# Patient Record
Sex: FEMALE | Race: WHITE
[De-identification: names, ages, dates, MRNs, and addresses within clinical notes are randomized per-mention and may not be internally consistent; named-entity substitution may affect disease eponyms.]

---

## 2018-03-24 ENCOUNTER — HOSPITAL ENCOUNTER (EMERGENCY)
Dept: HOSPITAL 17 - NEDAMB | Age: 83
Discharge: NURSING, REHAB OR LONG-TERM CARE | End: 2018-03-24
Payer: MEDICARE

## 2018-03-24 VITALS
HEART RATE: 48 BPM | DIASTOLIC BLOOD PRESSURE: 65 MMHG | OXYGEN SATURATION: 100 % | SYSTOLIC BLOOD PRESSURE: 149 MMHG | TEMPERATURE: 97.7 F | RESPIRATION RATE: 20 BRPM

## 2018-03-24 VITALS — DIASTOLIC BLOOD PRESSURE: 35 MMHG | SYSTOLIC BLOOD PRESSURE: 122 MMHG

## 2018-03-24 DIAGNOSIS — E78.00: ICD-10-CM

## 2018-03-24 DIAGNOSIS — I10: ICD-10-CM

## 2018-03-24 DIAGNOSIS — R00.1: ICD-10-CM

## 2018-03-24 DIAGNOSIS — Z88.1: ICD-10-CM

## 2018-03-24 DIAGNOSIS — E11.9: ICD-10-CM

## 2018-03-24 DIAGNOSIS — Z88.5: ICD-10-CM

## 2018-03-24 DIAGNOSIS — F03.90: ICD-10-CM

## 2018-03-24 DIAGNOSIS — E20.9: ICD-10-CM

## 2018-03-24 DIAGNOSIS — R07.9: Primary | ICD-10-CM

## 2018-03-24 LAB
ALBUMIN SERPL-MCNC: 3.1 GM/DL (ref 3.4–5)
ALP SERPL-CCNC: 70 U/L (ref 45–117)
ALT SERPL-CCNC: 28 U/L (ref 10–53)
AST SERPL-CCNC: 30 U/L (ref 15–37)
BILIRUB SERPL-MCNC: 0.5 MG/DL (ref 0.2–1)
BUN SERPL-MCNC: 25 MG/DL (ref 7–18)
CALCIUM SERPL-MCNC: 9.4 MG/DL (ref 8.5–10.1)
CHLORIDE SERPL-SCNC: 108 MEQ/L (ref 98–107)
CREAT SERPL-MCNC: 1.08 MG/DL (ref 0.5–1)
ERYTHROCYTE [DISTWIDTH] IN BLOOD BY AUTOMATED COUNT: 14.3 % (ref 11.6–17.2)
GFR SERPLBLD BASED ON 1.73 SQ M-ARVRAT: 48 ML/MIN (ref 89–?)
GLUCOSE SERPL-MCNC: 101 MG/DL (ref 74–106)
HCO3 BLD-SCNC: 27.6 MEQ/L (ref 21–32)
HCT VFR BLD CALC: 38.4 % (ref 35–46)
HGB BLD-MCNC: 12.9 GM/DL (ref 11.6–15.3)
INR PPP: 1 RATIO
MCH RBC QN AUTO: 32.3 PG (ref 27–34)
MCHC RBC AUTO-ENTMCNC: 33.7 % (ref 32–36)
MCV RBC AUTO: 95.9 FL (ref 80–100)
PLATELET # BLD: 186 TH/MM3 (ref 150–450)
PMV BLD AUTO: 10.7 FL (ref 7–11)
PROT SERPL-MCNC: 6.3 GM/DL (ref 6.4–8.2)
PROTHROMBIN TIME: 10.6 SEC (ref 9.8–11.6)
RBC # BLD AUTO: 4.01 MIL/MM3 (ref 4–5.3)
SODIUM SERPL-SCNC: 141 MEQ/L (ref 136–145)
TROPONIN I SERPL-MCNC: 0.04 NG/ML (ref 0.02–0.05)
WBC # BLD AUTO: 7.3 TH/MM3 (ref 4–11)

## 2018-03-24 PROCEDURE — 80053 COMPREHEN METABOLIC PANEL: CPT

## 2018-03-24 PROCEDURE — 85610 PROTHROMBIN TIME: CPT

## 2018-03-24 PROCEDURE — 84484 ASSAY OF TROPONIN QUANT: CPT

## 2018-03-24 PROCEDURE — 85027 COMPLETE CBC AUTOMATED: CPT

## 2018-03-24 PROCEDURE — 93005 ELECTROCARDIOGRAM TRACING: CPT

## 2018-03-24 PROCEDURE — 82550 ASSAY OF CK (CPK): CPT

## 2018-03-24 PROCEDURE — 85730 THROMBOPLASTIN TIME PARTIAL: CPT

## 2018-03-24 NOTE — EKG
Date Performed: 2018       Time Performed: 04:53:52

 

PTAGE:      87 years

 

EKG:      SINUS BRADYCARDIA VOLTAGE CRITERIA FOR LVH ST ELEVATION, CONSIDER ANTERIOR INJURY ***ACUTE 
MI*** Compared to 

 

 PREVIOUS TRACING            , there is now evidence of ST elevation inferiorly and anteriorly and an
terolaterally. Suggestive of an acute myocardial infarction. When compared to the prior study, there 
are significant changes in the anterior and lateral leads. PREVIOUS TRACIN/15/1998 09.23.29

 

DOCTOR:   Seven Neville  Interpretating Date/Time  2018 21:30:32

## 2018-03-24 NOTE — PD
HPI


Chief Complaint:  Chest Pain


Time Seen by Provider:  05:00


Travel History


International Travel<30 days:  No


Contact w/Intl Traveler<30days:  No


Traveled to known affect area:  No





History of Present Illness


HPI


Patient is a 87-year-old female living in a nursing home she was called 

paramedics because she had right-sided chest pain and was walking around the 

floors of the nursing home complaining of chest pain paramedics arrived they 

did EKG twelve-lead they thought they saw ST elevations in inferior leads lead 

II, III and aVF.  Patient is completely pain-free vitals within normal limits 

she was not hypertensive she is not tachycardic and she had no signs of 

ischemia no nausea no vomiting patient is pleasantly demented and denied chest 

pain at all repeatedly asked she has no chest pain we do an EKG that shows ST 

elevations in V4 V5.  There were no reciprocal depressions anywhere.  I called 

Dr. Chen's interventional cardiologist.  He thinks that intervention would 

be extremely in the pain-free 87-year-old female.  Call the family daughter 

arrives bedside and decides that they would like medical management.  Aspirin 

for 325 is given.  And labs are sent family is discussing whether they would 

like to send her back to the nursing home without intervention without 

hospitalization





PFSH


Past Medical History


High Cholesterol:  Yes


Dementia:  Yes


Diabetes:  Yes


Patient Takes Glucophage:  Yes


Genitourinary:  Yes (HYDRONEPHROSIS)


Hypertension:  Yes


Musculoskeletal:  Yes (PREPATELLAR BURSITIS)


Thyroid Disease:  Yes (HYPOPARATHYROID)





Past Surgical History


Surgical History:  Unable to Obtain





Social History


Alcohol Use:  No


Tobacco Use:  No


Substance Use:  No





Allergies-Medications


(Allergen,Severity, Reaction):  


Coded Allergies:  


     Milk Containing Products (Verified  Allergy, Unknown, 3/24/18)


     codeine (Verified  Allergy, Unknown, 3/24/18)


     erythromycin base (Verified  Allergy, Unknown, 3/24/18)


     pollen extracts (Verified  Allergy, Unknown, 3/24/18)





Review of Systems


ROS Limitations:  Poor Historian


Except as stated in HPI:  all other systems reviewed are Neg (dementia advanced)





Physical Exam


Narrative


GENERAL: thin body habitus  , pt  awake alert  Ox1 to  her name , does not 

recognize  daughter 


SKIN: Warm and dry.


HEAD: Atraumatic. Normocephalic. 


EYES: Pupils equal and round. No scleral icterus. No injection or drainage. 


ENT: No nasal bleeding or discharge.  Mucous membranes pink and moist.


NECK: Trachea midline. No JVD. 


CARDIOVASCULAR: Regular rate and rhythm.  


RESPIRATORY: No accessory muscle use. Clear to auscultation. Breath sounds 

equal bilaterally. 


GASTROINTESTINAL: Abdomen soft, non-tender, nondistended. Hepatic and splenic 

margins not palpable. 


MUSCULOSKELETAL: Extremities without clubbing, cyanosis, or edema. No obvious 

deformities. 


NEUROLOGICAL: Awake and alert. No obvious cranial nerve deficits.  Motor 

grossly within normal limits. Five out of 5 muscle strength in the arms and 

legs.  Normal speech.


PSYCHIATRIC: demented  OX1 . not to time place and does not recognized her 

daughter who is bedside





Data


Data


Last Documented VS





Vital Signs








  Date Time  Temp Pulse Resp B/P (MAP) Pulse Ox O2 Delivery O2 Flow Rate FiO2


 


3/24/18 07:51  52 17 122/35 (64) 99   


 


3/24/18 05:01 97.7       








Orders





 Orders


Aspirin Chew (Aspirin Chew) (3/24/18 05:06)


Heparin Inj (Heparin Inj) (3/24/18 05:15)


Heparin Inj (Heparin Inj) (3/24/18 11:15)


Heparin Inj (Heparin Inj) (3/24/18 11:15)


Heparin-D5w 25,000 U/250 Ml (Heparin-D5w (3/24/18 05:15)


Act Partial Throm Time (Ptt) (3/24/18 05:05)


Prothrombin Time / Inr (Pt) (3/24/18 05:05)


Cbc No Diff, Includes Plts (3/24/18 05:05)


Troponin I (3/24/18 05:07)


Ckmb (Isoenzyme) Profile (3/24/18 05:07)


Comprehensive Metabolic Panel (3/24/18 05:07)


Nitroglycerin Sl (Nitrostat Sl) (3/24/18 05:15)


Nitroglycerin 2% Oint (Nitroglycerin 2% (3/24/18 05:15)


Aspirin Chew (Aspirin Chew) (3/24/18 05:15)


Heparin Inj (Heparin Inj) (3/24/18 05:30)


Ed Discharge Order (3/24/18 06:44)


Electrocardiogram (3/24/18 04:53)





Labs





Laboratory Tests








Test


  3/24/18


05:09


 


White Blood Count 7.3 TH/MM3 


 


Red Blood Count 4.01 MIL/MM3 


 


Hemoglobin 12.9 GM/DL 


 


Hematocrit 38.4 % 


 


Mean Corpuscular Volume 95.9 FL 


 


Mean Corpuscular Hemoglobin 32.3 PG 


 


Mean Corpuscular Hemoglobin


Concent 33.7 % 


 


 


Red Cell Distribution Width 14.3 % 


 


Platelet Count 186 TH/MM3 


 


Mean Platelet Volume 10.7 FL 


 


Prothrombin Time 10.6 SEC 


 


Prothromb Time International


Ratio 1.0 RATIO 


 


 


Activated Partial


Thromboplast Time 22.4 SEC 


 


 


Blood Urea Nitrogen 25 MG/DL 


 


Creatinine 1.08 MG/DL 


 


Random Glucose 101 MG/DL 


 


Total Protein 6.3 GM/DL 


 


Albumin 3.1 GM/DL 


 


Calcium Level 9.4 MG/DL 


 


Alkaline Phosphatase 70 U/L 


 


Aspartate Amino Transf


(AST/SGOT) 30 U/L 


 


 


Alanine Aminotransferase


(ALT/SGPT) 28 U/L 


 


 


Total Bilirubin 0.5 MG/DL 


 


Sodium Level 141 MEQ/L 


 


Potassium Level 4.8 MEQ/L 


 


Chloride Level 108 MEQ/L 


 


Carbon Dioxide Level 27.6 MEQ/L 


 


Anion Gap 5 MEQ/L 


 


Estimat Glomerular Filtration


Rate 48 ML/MIN 


 


 


Total Creatine Kinase 75 U/L 


 


Troponin I 0.04 NG/ML 











[facility]


Medical Decision Making


Medical Screen Exam Complete:  Yes


Emergency Medical Condition:  Yes


Differential Diagnosis


chest pain  of  PNA  vs  STEMI  vs  costochondritis  vs  dementia and  

complaint of  CP  possibly  not related to Organic cause, Paramedics  12 lead  

have  slight elevation   inferiro leads   but in our  ER  12 lead  EKG  shows  

elevation  in  V4  V5, pt denies  CP  repeatedly  asked of  Chest Pain


Narrative Course


EKG  has   elevations  in  V4  V5   no  CP  ,  I call Dr Chen  who is  on 

for interventional cards  he looks at  EKG  and  feels  her age  and  DNR  and 

advanced dementia  and  pain free state  medical management  more indicated 

than   catherization ,  I will call family to ask if they agree with the plan  

of  conservative  managemnt  . Daugther arrives and she and her  sister are 

offered option of  catheterization  vs  heparin and  asa and  obsevartion on  

tele  , The  daughters  decide  they  would  like to  refuse all intervention 

and return to  [facility]  aand  there they will discuss  " Do  Not  Hositalize " order 

and  hospice  plan.  I speak to  both daughters several times  one bedside and 

one on the phone  . Pt  stable  first  trop returns  0.04  equivocal  . pt  

family  refuses admission and heparinization.. Pt sent via  ambulance back to 

[facility]





Diagnosis





 Primary Impression:  


 Chest pain


Patient Instructions:  Chest Pain (ED), General Instructions


Disposition:  03 DISCHARGE TO SNF


Condition:  Js Ontiveros MD Mar 24, 2018 06:42

## 2018-04-28 ENCOUNTER — HOSPITAL ENCOUNTER (INPATIENT)
Dept: HOSPITAL 17 - NEPE | Age: 83
LOS: 4 days | Discharge: NURSING, REHAB OR LONG-TERM CARE | DRG: 125 | End: 2018-05-02
Attending: HOSPITALIST | Admitting: HOSPITALIST
Payer: MEDICARE

## 2018-04-28 VITALS
DIASTOLIC BLOOD PRESSURE: 76 MMHG | TEMPERATURE: 98.1 F | OXYGEN SATURATION: 95 % | SYSTOLIC BLOOD PRESSURE: 158 MMHG | HEART RATE: 50 BPM | RESPIRATION RATE: 18 BRPM

## 2018-04-28 VITALS — HEIGHT: 61 IN | BODY MASS INDEX: 23.31 KG/M2 | WEIGHT: 123.46 LBS

## 2018-04-28 DIAGNOSIS — Z88.5: ICD-10-CM

## 2018-04-28 DIAGNOSIS — Z88.1: ICD-10-CM

## 2018-04-28 DIAGNOSIS — S00.83XA: ICD-10-CM

## 2018-04-28 DIAGNOSIS — Z88.8: ICD-10-CM

## 2018-04-28 DIAGNOSIS — S02.32XA: Primary | ICD-10-CM

## 2018-04-28 DIAGNOSIS — R04.0: ICD-10-CM

## 2018-04-28 DIAGNOSIS — Z91.011: ICD-10-CM

## 2018-04-28 DIAGNOSIS — I10: ICD-10-CM

## 2018-04-28 DIAGNOSIS — G30.9: ICD-10-CM

## 2018-04-28 DIAGNOSIS — D64.9: ICD-10-CM

## 2018-04-28 DIAGNOSIS — F02.80: ICD-10-CM

## 2018-04-28 DIAGNOSIS — K92.0: ICD-10-CM

## 2018-04-28 DIAGNOSIS — E78.5: ICD-10-CM

## 2018-04-28 DIAGNOSIS — E11.9: ICD-10-CM

## 2018-04-28 DIAGNOSIS — S02.2XXA: ICD-10-CM

## 2018-04-28 DIAGNOSIS — Z79.82: ICD-10-CM

## 2018-04-28 DIAGNOSIS — H53.2: ICD-10-CM

## 2018-04-28 DIAGNOSIS — S40.022A: ICD-10-CM
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
## 2018-04-28 DIAGNOSIS — S50.12XA: ICD-10-CM

## 2018-04-28 DIAGNOSIS — Z79.84: ICD-10-CM

## 2018-04-28 DIAGNOSIS — E20.9: ICD-10-CM

## 2018-04-28 LAB
BASOPHILS # BLD AUTO: 0 TH/MM3 (ref 0–0.2)
BASOPHILS NFR BLD: 0.7 % (ref 0–2)
BUN SERPL-MCNC: 20 MG/DL (ref 7–18)
CALCIUM SERPL-MCNC: 9.3 MG/DL (ref 8.5–10.1)
CHLORIDE SERPL-SCNC: 102 MEQ/L (ref 98–107)
CREAT SERPL-MCNC: 1.06 MG/DL (ref 0.5–1)
EOSINOPHIL # BLD: 0.2 TH/MM3 (ref 0–0.4)
EOSINOPHIL NFR BLD: 3.1 % (ref 0–4)
ERYTHROCYTE [DISTWIDTH] IN BLOOD BY AUTOMATED COUNT: 13.3 % (ref 11.6–17.2)
GFR SERPLBLD BASED ON 1.73 SQ M-ARVRAT: 49 ML/MIN (ref 89–?)
GLUCOSE SERPL-MCNC: 146 MG/DL (ref 74–106)
HCO3 BLD-SCNC: 30.2 MEQ/L (ref 21–32)
HCT VFR BLD CALC: 39 % (ref 35–46)
HGB BLD-MCNC: 13.1 GM/DL (ref 11.6–15.3)
LYMPHOCYTES # BLD AUTO: 2.4 TH/MM3 (ref 1–4.8)
LYMPHOCYTES NFR BLD AUTO: 36.1 % (ref 9–44)
MCH RBC QN AUTO: 31.9 PG (ref 27–34)
MCHC RBC AUTO-ENTMCNC: 33.6 % (ref 32–36)
MCV RBC AUTO: 95 FL (ref 80–100)
MONOCYTE #: 0.5 TH/MM3 (ref 0–0.9)
MONOCYTES NFR BLD: 7.9 % (ref 0–8)
NEUTROPHILS # BLD AUTO: 3.5 TH/MM3 (ref 1.8–7.7)
NEUTROPHILS NFR BLD AUTO: 52.2 % (ref 16–70)
PLATELET # BLD: 163 TH/MM3 (ref 150–450)
PMV BLD AUTO: 10.3 FL (ref 7–11)
RBC # BLD AUTO: 4.1 MIL/MM3 (ref 4–5.3)
SODIUM SERPL-SCNC: 140 MEQ/L (ref 136–145)
WBC # BLD AUTO: 6.7 TH/MM3 (ref 4–11)

## 2018-04-28 PROCEDURE — 85025 COMPLETE CBC W/AUTO DIFF WBC: CPT

## 2018-04-28 PROCEDURE — 72125 CT NECK SPINE W/O DYE: CPT

## 2018-04-28 PROCEDURE — 73060 X-RAY EXAM OF HUMERUS: CPT

## 2018-04-28 PROCEDURE — 82550 ASSAY OF CK (CPK): CPT

## 2018-04-28 PROCEDURE — 85027 COMPLETE CBC AUTOMATED: CPT

## 2018-04-28 PROCEDURE — 80053 COMPREHEN METABOLIC PANEL: CPT

## 2018-04-28 PROCEDURE — 70450 CT HEAD/BRAIN W/O DYE: CPT

## 2018-04-28 PROCEDURE — 73120 X-RAY EXAM OF HAND: CPT

## 2018-04-28 PROCEDURE — 80048 BASIC METABOLIC PNL TOTAL CA: CPT

## 2018-04-28 PROCEDURE — 83735 ASSAY OF MAGNESIUM: CPT

## 2018-04-28 PROCEDURE — 70486 CT MAXILLOFACIAL W/O DYE: CPT

## 2018-04-28 PROCEDURE — 82948 REAGENT STRIP/BLOOD GLUCOSE: CPT

## 2018-04-28 NOTE — RADRPT
EXAM DATE/TIME:  04/28/2018 21:25 

 

[facility] COMPARISON:     

No previous studies available for comparison.

 

 

INDICATIONS :     

Trauma. Fell on left side of face.

                      

 

RADIATION DOSE:     

21.96 CTDIvol (mGy) 

 

 

MEDICAL HISTORY :     

Dementia. Hypertension. 

 

SURGICAL HISTORY :      

None. 

 

ENCOUNTER:      

Initial

 

ACUITY:      

1 day

 

PAIN SCORE:      

0/10

 

LOCATION:       

Left facial 

 

TECHNIQUE:     

Volumetric scanning of the facial bones was performed.  Using automated exposure control and adjustme
nt of the mA and/or kV according to patient size, radiation dose was kept as low as reasonably achiev
able to obtain optimal diagnostic quality images.  DICOM format image data is available electronicall
y for review and comparison.  

 

FINDINGS:     

 

ORBITS:     

Right orbit intact. Displaced fracture along the floor the left orbit. No impingement upon extraocula
r muscles. The inferior oblique musculature slightly herniates through the defect.

 

NASAL BONE:     

Left nasal fracture slightly depressed.

 

ZYGOMATIC ARCHES:     

Symmetric without evidence of fracture.

 

SINUSES:     

The maxillary, ethmoid and frontal sinuses are intact.  No air-fluid levels seen.

 

NASAL CAVITY:     

The nasal septum is intact and midline.  The lacrimal ducts are intact.

 

SOFT TISSUES:     

No radiopaque foreign bodies seen. Left facial soft-tissue swelling/hematoma is seen.

 

INTRACRANIAL:     

No intracranial air seen.

 

CRIBIFORM PLATE:     

Grossly intact.

 

CONCLUSION:     

1. Displaced fracture through the floor the left orbit with slight herniation of the inferior oblique
 musculature.

2. Left nasal fracture.

 

 

 

 Roel Fortune MD on April 28, 2018 at 21:40           

Board Certified Radiologist.

 This report was verified electronically.

## 2018-04-28 NOTE — RADRPT
EXAM DATE/TIME:  04/28/2018 21:16 

 

[facility] COMPARISON:     

No previous studies available for comparison.

 

                     

INDICATIONS :     

Pain in left humerus post fall today.

                     

 

MEDICAL HISTORY :            

Unobtainable.   

 

SURGICAL HISTORY :        

Unobtainable.

 

ENCOUNTER:     

Initial                                        

 

ACUITY:     

1 day      

 

PAIN SCORE:     

Non-responsive.

 

LOCATION:     

Left  Humerus.

 

FINDINGS:     

Two view examination of the left humerus demonstrates no evidence of fracture or dislocation.  Bony m
ineralization is normal. Soft tissue swelling.

 

CONCLUSION:     No acute fracture.  

 

 

 

 Roel Fortune MD on April 28, 2018 at 21:38           

Board Certified Radiologist.

 This report was verified electronically.

## 2018-04-28 NOTE — RADRPT
EXAM DATE/TIME:  04/28/2018 21:25 

 

[facility] COMPARISON:     

No previous studies available for comparison.

 

 

INDICATIONS :     

Trauma. Fall.

                      

 

RADIATION DOSE:     

13.52 CTDIvol (mGy) 

 

 

 

MEDICAL HISTORY :     

Dementia. Hypertension. 

 

SURGICAL HISTORY :      

None. 

 

ENCOUNTER:      

Initial

 

ACUITY:      

1 day

 

PAIN SCALE:      

0/10

 

LOCATION:        

neck 

 

TECHNIQUE:     

Volumetric scanning of the cervical spine was performed. Multiplanar reconstructions in the sagittal,
 coronal and oblique axial planes were performed.   Using automated exposure control and adjustment o
f the mA and/or kV according to patient size, radiation dose was kept as low as reasonably achievable
 to obtain optimal diagnostic quality images.   DICOM format image data is available electronically f
or review and comparison.  

 

FINDINGS:     

 

VERTEBRAE:     

Normal vertebral body height. Multilevel degenerative changes.

 

ALIGNMENT:     

Retrolisthesis C3 on 4.

 

C2-C3:  

The bony spinal canal is normal in size.  No evidence of disc bulge or herniation.  The neural forami
na are bilaterally patent.

 

C3-C4:  

The bony spinal canal is normal in size.  No evidence of disc bulge or herniation.  The neural forami
na are bilaterally patent.

 

C4-C5:  

The bony spinal canal is normal in size.  No evidence of disc bulge or herniation.  The neural forami
na are bilaterally patent.

 

C5-C6:  

The bony spinal canal is normal in size.  No evidence of disc bulge or herniation.  The neural forami
na are bilaterally patent.

 

C6-C7:  

The bony spinal canal is normal in size.  No evidence of disc bulge or herniation.  The neural forami
na are bilaterally patent.

 

C7-T1:  

The bony spinal canal is normal in size.  No evidence of disc bulge or herniation.  The neural forami
na are bilaterally patent.

 

CONCLUSION:     

1. No fracture or subluxation. 

2. Multilevel degenerative changes.

3. Degenerative retrolisthesis C3 on 4

 

 

 

 Roel Fortune MD on April 28, 2018 at 21:42           

Board Certified Radiologist.

 This report was verified electronically.

## 2018-04-28 NOTE — RADRPT
EXAM DATE/TIME:  04/28/2018 21:25 

 

[facility] COMPARISON:     

CT FACIAL BONES W/O CONTRAST, April 28, 2018, 21:25.

 

 

INDICATIONS :     

Trauma. Fell on left side off head.

                      

 

RADIATION DOSE:     

56.35 CTDIvol (mGy) 

 

 

 

MEDICAL HISTORY :     

Dementia. Hypertension. 

 

SURGICAL HISTORY :      

None. 

 

ENCOUNTER:      

Initial

 

ACUITY:      

1 day

 

PAIN SCALE:      

0/10

 

LOCATION:       

Left cranial 

 

TECHNIQUE:     

Multiple contiguous axial images were obtained of the head.  Using automated exposure control and adj
ustment of the mA and/or kV according to patient size, radiation dose was kept as low as reasonably a
chievable to obtain optimal diagnostic quality images.   DICOM format image data is available electro
nically for review and comparison.  

 

FINDINGS:     

 

CEREBRUM:     

The ventricles are normal for age.  No evidence of midline shift, mass lesion, hemorrhage or acute in
farction.  No extra-axial fluid collections are seen.

 

POSTERIOR FOSSA:     

The cerebellum and brainstem are intact.  The 4th ventricle is midline.  The cerebellopontine angle i
s unremarkable.

 

EXTRACRANIAL:     

The visualized portion of the orbits is intact.

 

SKULL:     

The calvaria is intact.  No evidence of skull fracture. Left sided facial soft tissue swelling. Opaci
fication left maxillary sinus. Left nasal fracture

 

CONCLUSION:     

1. No acute intracranial abnormality.

2. Left nasal fracture.

 

 

 

 Roel Fortune MD on April 28, 2018 at 21:39           

Board Certified Radiologist.

 This report was verified electronically.

## 2018-04-28 NOTE — PD
HPI


Chief Complaint:  Fall


Time Seen by Provider:  21:01


Travel History


International Travel<30 days:  No


Contact w/Intl Traveler<30days:  No


Traveled to known affect area:  No





History of Present Illness


HPI


The patient is a 87-year-old  female who presents to the emergency 

department via EMS after she was found lying prone on the floor at the nursing 

home and groaning.  The patient has a history of dementia, the patient is 

unsure if there was any loss of consciousness.  The patient was noted to have 

left facial swelling, some bleeding from the left nare, and some bruising to 

the left forearm.  The patient does not take any anticoagulants according to 

EMS.  The patient does complain of left facial pain and left arm pain.  She 

denies any chest pain, shortness breath, nausea, vomiting, or abdominal pain.  

She is a somewhat limited historian, but does respond to her name and follows 

simple commands.  Symptoms are moderate.  Possibly exacerbated after falling.





PFSH


Past Medical History


High Cholesterol:  Yes


Dementia:  Yes


Diabetes:  Yes


Genitourinary:  Yes (HYDRONEPHROSIS)


Hypertension:  Yes


Musculoskeletal:  Yes (PREPATELLAR BURSITIS)


Thyroid Disease:  Yes (HYPOPARATHYROID)





Social History


Alcohol Use:  No


Tobacco Use:  No


Substance Use:  No





Allergies-Medications


(Allergen,Severity, Reaction):  


Coded Allergies:  


     Milk Containing Products (Verified  Allergy, Unknown, 4/28/18)


     codeine (Verified  Allergy, Unknown, 4/28/18)


     erythromycin base (Verified  Allergy, Unknown, 4/28/18)


     pollen extracts (Verified  Allergy, Unknown, 4/28/18)


Reported Meds & Prescriptions





Reported Meds & Active Scripts


Active


Norco (Hydrocodone-Acetaminophen) 5 Mg-325 Mg Tab 1 Tab PO Q6H PRN


Claritin (Loratadine) 10 Mg Cap 10 Mg PO DAILY 10 Days


Augmentin Es-600 Liq (Amoxicillin-Clavulanate Liq) 600-42.9 Mg/5 Ml Susp 600 Mg 

PO BID 10 Days


     Not for adults, adolescents, or children >/= 40kg. Not interchangeable


     with 200 mg/5 mL or 400 mg/5 mL due to clavulanic acid.


Reported


Melatonin 5 Mg Tab 3 Mg PO HS


Pravastatin 40 Mg Tab 40 Mg PO DAILY


Nuedexta 20-10 mg (Dextromethorphan HBr-Quinidine) 20 Mg-10 Mg Cap 1 Cap PO BID


Buspirone (Buspirone HCl) 10 Mg Tab 20 Mg PO BID


Metformin (Metformin HCl) 500 Mg Tab 500 Mg PO DAILY


     With a meal


Lisinopril-Hctz 20-12.5 Mg Tab 1 Tab PO DAILY


Aspirin 81 Mg Chew 81 Mg CHEW DAILY


Amlodipine (Amlodipine Besylate) 2.5 Mg Tab 2.5 Mg PO DAILY








Review of Systems


Except as stated in HPI:  all other systems reviewed are Neg


HENT:  Positive: Headaches, Nosebleed, Other (Facial pain), No: Neck Pain


Cardiovascular:  No: Chest Pain or Discomfort


Respiratory:  No: Shortness of Breath


Gastrointestinal:  No: Nausea, Vomiting, Abdominal Pain


Musculoskeletal:  Positive: Pain (Left arm pain and bruising)


Neurologic:  Positive: Headache, No: Change in Mentation, Paresthesia, Sensory 

Disturbance





Physical Exam


Narrative


GENERAL: Awake, alert, pleasant 87-year-old female who appears her stated age 

and is in no acute respiratory distress.


SKIN: Focused skin assessment warm/dry.


HEAD: Left facial edema noted around the left periorbital area and left maxilla 

and mandible.. 


EYES: Pupils equal and round.  Pupils are 2 mm bilateral and reactive.   Unable 

to assess EOMs secondary to swelling, but no visible hyphema.  Significant left 

periorbital edema noted.


ENT: No visible septal hematoma.  Dry blood in the left nare.


NECK: Trachea midline. No JVD.  Cervical collar in place.


CARDIOVASCULAR: Regular, bradycardic with a heart rate in the 40s.


RESPIRATORY: No accessory muscle use. Clear to auscultation. Breath sounds 

equal bilaterally. 


GASTROINTESTINAL: Abdomen soft, non-tender, nondistended.  No rebound 

tenderness.


MUSCULOSKELETAL: Hematoma noted to the left humerus.  Patient is able to abduct 

and extend the left shoulder as well as flex and extend the left elbow.


NEUROLOGICAL: Awake and alert. No obvious cranial nerve deficits.  Motor 

grossly within normal limits. Normal speech.  Patient is oriented to her name 

and follow simple commands, but does not know the month or year.


Back: No tenderness over the thoracic or lumbar vertebrae.


PSYCHIATRIC: Appropriate mood and affect; insight and judgment normal.





Data


Data


Last Documented VS





Vital Signs








  Date Time  Temp Pulse Resp B/P (MAP) Pulse Ox O2 Delivery O2 Flow Rate FiO2


 


4/28/18 21:16  49 15  96 Room Air  


 


4/28/18 21:07 98.1   158/76 (103)    








Orders





 Orders


Ct Facial Bones W/O Iv Cont (4/28/18 )


Ct Brain W/O Iv Contrast(Rout) (4/28/18 )


Ct Cerv Spine W/O Contrast (4/28/18 )


Humerus (Min 2vws) (4/28/18 )


Complete Blood Count With Diff (4/28/18 21:07)


Basic Metabolic Panel (Bmp) (4/28/18 21:07)


Creatine Kinase (Cpk) (4/28/18 21:07)


Acetaminophen (Tylenol) (4/28/18 21:15)


Acetamin-Hydrocod 325-5 Mg (Norco  5-325 (4/28/18 22:30)


Ondansetron Inj (Zofran Inj) (4/28/18 22:45)


Sodium Chlorid 0.9% 500 Ml Inj (Ns 500 M (4/28/18 22:45)


Ondansetron Inj (Zofran Inj) (4/28/18 22:43)


Admit Order (Ed Use Only) (4/28/18 22:56)





Labs





Laboratory Tests








Test


  4/28/18


21:15


 


White Blood Count 6.7 TH/MM3 


 


Red Blood Count 4.10 MIL/MM3 


 


Hemoglobin 13.1 GM/DL 


 


Hematocrit 39.0 % 


 


Mean Corpuscular Volume 95.0 FL 


 


Mean Corpuscular Hemoglobin 31.9 PG 


 


Mean Corpuscular Hemoglobin


Concent 33.6 % 


 


 


Red Cell Distribution Width 13.3 % 


 


Platelet Count 163 TH/MM3 


 


Mean Platelet Volume 10.3 FL 


 


Neutrophils (%) (Auto) 52.2 % 


 


Lymphocytes (%) (Auto) 36.1 % 


 


Monocytes (%) (Auto) 7.9 % 


 


Eosinophils (%) (Auto) 3.1 % 


 


Basophils (%) (Auto) 0.7 % 


 


Neutrophils # (Auto) 3.5 TH/MM3 


 


Lymphocytes # (Auto) 2.4 TH/MM3 


 


Monocytes # (Auto) 0.5 TH/MM3 


 


Eosinophils # (Auto) 0.2 TH/MM3 


 


Basophils # (Auto) 0.0 TH/MM3 


 


CBC Comment DIFF FINAL 


 


Differential Comment  


 


Blood Urea Nitrogen 20 MG/DL 


 


Creatinine 1.06 MG/DL 


 


Random Glucose 146 MG/DL 


 


Calcium Level 9.3 MG/DL 


 


Sodium Level 140 MEQ/L 


 


Potassium Level 3.9 MEQ/L 


 


Chloride Level 102 MEQ/L 


 


Carbon Dioxide Level 30.2 MEQ/L 


 


Anion Gap 8 MEQ/L 


 


Estimat Glomerular Filtration


Rate 49 ML/MIN 


 


 


Total Creatine Kinase 76 U/L 











MDM


Medical Decision Making


Medical Screen Exam Complete:  Yes


Emergency Medical Condition:  Yes


Medical Record Reviewed:  Yes


Interpretation(s)





Laboratory Tests








Test


  4/28/18


21:15


 


White Blood Count 6.7 TH/MM3 


 


Red Blood Count 4.10 MIL/MM3 


 


Hemoglobin 13.1 GM/DL 


 


Hematocrit 39.0 % 


 


Mean Corpuscular Volume 95.0 FL 


 


Mean Corpuscular Hemoglobin 31.9 PG 


 


Mean Corpuscular Hemoglobin


Concent 33.6 % 


 


 


Red Cell Distribution Width 13.3 % 


 


Platelet Count 163 TH/MM3 


 


Mean Platelet Volume 10.3 FL 


 


Neutrophils (%) (Auto) 52.2 % 


 


Lymphocytes (%) (Auto) 36.1 % 


 


Monocytes (%) (Auto) 7.9 % 


 


Eosinophils (%) (Auto) 3.1 % 


 


Basophils (%) (Auto) 0.7 % 


 


Neutrophils # (Auto) 3.5 TH/MM3 


 


Lymphocytes # (Auto) 2.4 TH/MM3 


 


Monocytes # (Auto) 0.5 TH/MM3 


 


Eosinophils # (Auto) 0.2 TH/MM3 


 


Basophils # (Auto) 0.0 TH/MM3 


 


CBC Comment DIFF FINAL 


 


Differential Comment  


 


Blood Urea Nitrogen 20 MG/DL 


 


Creatinine 1.06 MG/DL 


 


Random Glucose 146 MG/DL 


 


Calcium Level 9.3 MG/DL 


 


Sodium Level 140 MEQ/L 


 


Potassium Level 3.9 MEQ/L 


 


Chloride Level 102 MEQ/L 


 


Carbon Dioxide Level 30.2 MEQ/L 


 


Anion Gap 8 MEQ/L 


 


Estimat Glomerular Filtration


Rate 49 ML/MIN 


 


 


Total Creatine Kinase 76 U/L 








Last Impressions








Maxillofacial CT 4/28/18 0000 Signed





Impressions: 





 Service Date/Time:  Saturday, April 28, 2018 21:25 - CONCLUSION:  1. Displaced 





 fracture through the floor the left orbit with slight herniation of the 

inferior 





 oblique musculature. 2. Left nasal fracture.     Roel Fortune MD 


 


Humerus X-Ray 4/28/18 0000 Signed





Impressions: 





 Service Date/Time:  Saturday, April 28, 2018 21:16 - CONCLUSION: No acute 





 fracture.       Roel Fortune MD 


 


Head CT 4/28/18 0000 Signed





Impressions: 





 Service Date/Time:  Saturday, April 28, 2018 21:25 - CONCLUSION:  1. No acute 





 intracranial abnormality. 2. Left nasal fracture.     Roel Fortune MD 


 


Cervical Spine CT 4/28/18 0000 Signed





Impressions: 





 Service Date/Time:  Saturday, April 28, 2018 21:25 - CONCLUSION:  1. No 

fracture 





 or subluxation.  2. Multilevel degenerative changes. 3. Degenerative 





 retrolisthesis C3 on 4     Roel Fortune MD 








Differential Diagnosis


Differential diagnosis includes mechanical fall, syncope, closed head injury, 

intra-cranial hemorrhage, left facial fracture, contusion, hematoma, left 

humerus fracture, epistaxis, coagulopathy, thrombocytopenia.


Narrative Course


IV was established, labs are drawn and sent, and the patient was placed on 

cardiac telemetry monitoring and continuous pulse oximetry monitoring.  CT of 

the brain, facial bones, cervical spine were ordered.  X-ray of the left 

humerus was ordered.  CT the brain is negative.  CT cervical spine reveals no 

fracture, cervical collar was removed.  CT the facial bones reveals fracture of 

the left inferior orbital wall with mild herniation of musculature but no 

impingement.  Patient has significant swelling, EOMs were unable to be 

assessed.  The patient does have significant left-sided facial swelling.  I 

doubt acute surgical management with the amount of significant swelling.  The 

patient may benefit from outpatient or maxillofacial evaluation once the 

swelling has improved.  Therefore, the patient will be discharged back to the 

assisted living facility, is advised to apply ice over the affected area, 

Augmentin as directed, sinus precautions.  I did have a discussion with the 

family at bedside.  I also had a discussion regarding transfer to [facility] for the fracture, however, after discussion it was agreed the patient 

would follow-up outpatient locally.





However, prior to discharge back to the assisted living facility the patient 

had an episode of emesis.  There was blood in the emesis, most likely related 

to the nosebleed and swallowing of blood.  However, she did have several 

episodes.  The patient does live in the assisted living facility, not a full 

nursing home.  Therefore, the patient will be a 23 hour observation for IV 

fluids and antiemetics.  I discussed the patient with the trauma surgeon, Dr. Torres, and after discussion he states the patient can be admitted to the 

medical service as it appears it is more related to nausea and vomiting 

secondary to swallowing of blood and there is no acute surgical management.  

Therefore, the on-call medical service was paged for observation.





Physician Communication


Physician Communication


The on-call medical service was paged for observation.  I discussed the patient 

with Dr. Strong who agrees with 23 hour observation.





Diagnosis





 Primary Impression:  


 Fracture of inferior orbital wall


 Qualified Codes:  S02.32XA - Fracture of orbital floor, left side, initial 

encounter for closed fracture


 Additional Impressions:  


 Traumatic hematoma of face


 Qualified Codes:  S00.83XA - Contusion of other part of head, initial encounter


 Nasal fracture


 Qualified Codes:  S02.2XXA - Fracture of nasal bones, initial encounter for 

closed fracture


 Contusion of left arm


 Qualified Codes:  S40.022A - Contusion of left upper arm, initial encounter


 Hematemesis


 Qualified Codes:  K92.0 - Hematemesis





Admitting Information


Admitting Physician Requests:  Observation


Referrals:  


Law Galeano DDS


call for appointment


Call for an appointment on an outpatient basis to be seen in 1 week


Patient Instructions:  General Instructions





***Additional Instructions:  


Sinus precautions.  No blowing nose.  Claritin and Augmentin as directed.  

Apply ice to the left side of the face.  Please provide the family a copy of 

the CT results and the patient a copy of the CT results at discharge.  Call on 

Monday to schedule outpatient follow-up with oromaxillofacial surgery.  [place] 

as needed for pain.  Apply ice to the left side of the face and left arm.


***Med/Other Pt SpecificInfo:  Prescription(s) given


Scripts


Hydrocodone-Acetaminophen (Norco) 5 Mg-325 Mg Tab


1 TAB PO Q6H Y for PAIN, #15 TAB 0 Refills


   Prov: Guillermo Vaughn MD         4/28/18 


Loratadine (Claritin) 10 Mg Cap


10 MG PO DAILY for Allergy Management for 10 Days, #10 CAP 0 Refills


   Prov: Guillermo Vaughn MD         4/28/18 


Amoxicillin-Clavulanate Liq (Augmentin Es-600 Liq) 600-42.9 Mg/5 Ml Susp


600 MG PO BID for Infection for 10 Days, ML 0 Refills


   Not for adults, adolescents, or children >/= 40kg. Not interchangeable


   with 200 mg/5 mL or 400 mg/5 mL due to clavulanic acid.


   Prov: Guillermo Vaughn MD         4/28/18


Condition:  Stable











Guillermo Vaughn MD Apr 28, 2018 21:16

## 2018-04-28 NOTE — HHI.HP
__________________________________________________





[facility]


Service


[facility]


Primary Care Physician


Hellen Rai MD


Admission Diagnosis





Inferior orbital wall fracture, hematemesis, epistaxis, CHI


Diagnoses:  


(1) Fall


Diagnosis:  Principal





(2) Orbital floor fracture


Diagnosis:  Principal





(3) Traumatic hematoma of face


Diagnosis:  Principal





(4) Epistaxis


Diagnosis:  Principal





(5) DM (diabetes mellitus)


Diagnosis:  Principal





Travel History


International Travel<30 Days:  No


Contact w/Intl Traveler <30 Da:  No


Traveled to Known Affected Are:  No


History of Present Illness


This is an 87-year-old female with a PMH of HTN, Hyperlipidemia, DM and 

Dementia who is brought to the ER by EMS after a fall with significant left-

sided facial swelling and hematoma.  Patient currently [facility] resident, fall was 

unwitnessed however patient does not believe she lost consciousness.  Not on 

anticoagulation.  On arrival, /76, HR 50, O2 sat 95% on RA, Afebrile.  

CBC unremarkable.  Chemistry at baseline.  CT Head with no acute findings, left 

nasal fracture.  CT C-spine with no acute fracture.  CT Maxillofacial with 

displaced fracture through floor of left orbit with slight herniation of 

inferior oblique musculature, left nasal fracture.  Humerus X-ray no acute 

fracture.  Pt to be d/c'd from ER w/ outpatient ENT follow up as no emergent 

intervention needed, however had episode of epistaxis w/ subsequent nausea/

vomiting.  Pt [facility] resident, unsafe d/c home at this time.





Review of Systems


Except as stated in HPI:  all other systems reviewed are Neg


ROS: 14 point review of systems otherwise negative.





Past Family Social History


Past Medical History


PMH:  HTN, Hyperlipidemia, DM and Dementia


Past Surgical History


PAST SURGICAL HISTORY: None


Allergies:  


Coded Allergies:  


     Milk Containing Products (Verified  Allergy, Unknown, 18)


     codeine (Verified  Allergy, Unknown, 18)


     erythromycin base (Verified  Allergy, Unknown, 18)


     pollen extracts (Verified  Allergy, Unknown, 18)


Family History


PAST FAMILY HISTORY:  Reviewed.  No h/o DM or CAD


Social History


PAST SOCIAL HISTORY: Negative for alcohol, tobacco or drugs.





Physical Exam


Vital Signs





Vital Signs








  Date Time  Temp Pulse Resp B/P (MAP) Pulse Ox O2 Delivery O2 Flow Rate FiO2


 


18 21:16  49 15  96 Room Air  


 


18 21:07 98.1 50 18 158/76 (103) 95   








Physical Exam


PE:


GENERAL: Elderly white female in no acute distress.  Family at bedside.


HEENT: PERRLA, EOMI. No scleral icterus or conjunctival pallor. No lid lag or 

facial droop. Significant left-sided facial swelling/hematoma due to injury


CARDIOVASCULAR: Regular rate and rhythm.  No obvious murmurs to auscultation. 

No chest tenderness to palpation. 


RESPIRATORY: No obvious rhonchi or wheezing. Clear to auscultation. Breath 

sounds equal bilaterally. 


GASTROINTESTINAL: Abdomen soft, non-tender, nondistended. BS normal. 


MUSCULOSKELETAL: Extremities without clubbing, cyanosis, or edema. No obvious 

deformities. 


NEUROLOGICAL: Awake, alert and oriented x4. No focal neurologic deficits. 

Moving both upper and lower extremities spontaneously.


Laboratory





Laboratory Tests








Test


  18


21:15


 


White Blood Count 6.7 


 


Red Blood Count 4.10 


 


Hemoglobin 13.1 


 


Hematocrit 39.0 


 


Mean Corpuscular Volume 95.0 


 


Mean Corpuscular Hemoglobin 31.9 


 


Mean Corpuscular Hemoglobin


Concent 33.6 


 


 


Red Cell Distribution Width 13.3 


 


Platelet Count 163 


 


Mean Platelet Volume 10.3 


 


Neutrophils (%) (Auto) 52.2 


 


Lymphocytes (%) (Auto) 36.1 


 


Monocytes (%) (Auto) 7.9 


 


Eosinophils (%) (Auto) 3.1 


 


Basophils (%) (Auto) 0.7 


 


Neutrophils # (Auto) 3.5 


 


Lymphocytes # (Auto) 2.4 


 


Monocytes # (Auto) 0.5 


 


Eosinophils # (Auto) 0.2 


 


Basophils # (Auto) 0.0 


 


CBC Comment DIFF FINAL 


 


Differential Comment  


 


Blood Urea Nitrogen 20 


 


Creatinine 1.06 


 


Random Glucose 146 


 


Calcium Level 9.3 


 


Sodium Level 140 


 


Potassium Level 3.9 


 


Chloride Level 102 


 


Carbon Dioxide Level 30.2 


 


Anion Gap 8 


 


Estimat Glomerular Filtration


Rate 49 


 


 


Total Creatine Kinase 76 








Result Diagram:  


18








Caprini VTE Risk Assessment


Caprini VTE Risk Assessment:  No/Low Risk (score <= 1)


Caprini Risk Assessment Model











 Point Value = 1          Point Value = 2  Point Value = 3  Point Value = 5


 


Age 41-60


Minor surgery


BMI > 25 kg/m2


Swollen legs


Varicose veins


Pregnancy or postpartum


History of unexplained or recurrent


   spontaneous 


Oral contraceptives or hormone


   replacement


Sepsis (< 1 month)


Serious lung disease, including


   pneumonia (< 1 month)


Abnormal pulmonary function


Acute myocardial infarction


Congestive heart failure (< 1 month)


History of inflammatory bowel disease


Medical patient at bed rest Age 61-74


Arthroscopic surgery


Major open surgery (> 45 min)


Laparoscopic surgery (> 45 min)


Malignancy


Confined to bed (> 72 hours)


Immobilizing plaster cast


Central venous access Age >= 75


History of VTE


Family history of VTE


Factor V Leiden


Prothrombin 16540O


Lupus anticoagulant


Anticardiolipin antibodies


Elevated serum homocysteine


Heparin-induced thrombocytopenia


Other congenital or acquired


   thrombophilia Stroke (< 1 month)


Elective arthroplasty


Hip, pelvis, or leg fracture


Acute spinal cord injury (< 1 month)








Prophylaxis Regimen











   Total Risk


Factor Score Risk Level Prophylaxis Regimen


 


0-1      Low Early ambulation


 


2 Moderate Order ONE of the following:


*Sequential Compression Device (SCD)


*Heparin 5000 units SQ BID


 


3-4 Higher Order ONE of the following medications:


*Heparin 5000 units SQ TID


*Enoxaparin/Lovenox 40 mg SQ daily (WT < 150 kg, CrCl > 30 mL/min)


*Enoxaparin/Lovenox 30 mg SQ daily (WT < 150 kg, CrCl > 10-29 mL/min)


*Enoxaparin/Lovenox 30 mg SQ BID (WT < 150 kg, CrCl > 30 mL/min)


AND/OR


*Sequential Compression Device (SCD)


 


5 or more Highest Order ONE of the following medications:


*Heparin 5000 units SQ TID (Preferred with Epidurals)


*Enoxaparin/Lovenox 40 mg SQ daily (WT < 150 kg, CrCl > 30 mL/min)


*Enoxaparin/Lovenox 30 mg SQ daily (WT < 150 kg, CrCl > 10-29 mL/min)


*Enoxaparin/Lovenox 30 mg SQ BID (WT < 150 kg, CrCl > 30 mL/min)


AND


*Sequential Compression Device (SCD)











Assessment and Plan


Problem List:  


(1) Fall


ICD Code:  W19.XXXA - Unspecified fall, initial encounter


(2) Orbital floor fracture


ICD Code:  S02.30XA - Fracture of orbital floor, unspecified side, initial 

encounter for closed fracture


(3) Traumatic hematoma of face


ICD Code:  S00.83XA - Contusion of other part of head, initial encounter


Status:  Acute


(4) Epistaxis


ICD Code:  R04.0 - Epistaxis


(5) DM (diabetes mellitus)


ICD Code:  E11.9 - Type 2 diabetes mellitus without complications


Assessment and Plan


A/P:


1.  Fall:  s/p mechanical fall at [facility], unwitnessed, pt does not believe she 

lost consciousness.  CT Head/C-Spine w/ no acute findings, images reviewed by 

me. 


2.  Left Orbital Floor Fx:  secondary to above, CT Maxillofacial w/ displaced 

fracture through floor of left orbit with slight herniation of inferior oblique 

musculature, images reviewed by me, no impingement, no emergent need for 

surgical intervention, pt to follow up w/ ENT as outpatient upon discharge. 


3.  Left Facial Trauma:  w/ associated nasal fracture, s/p eval by Dr. Torres

, pt appropriate for medical admission for observation.  Analgesics/

antiemetics.  Ice packs. 


4.  Epistaxis:  secondary to above, w/ associated nausea/vomiting, bleeding now 

controlled.  Hemodynamically stable.  Will monitor overnight, repeat labs in 

am. 


5.  DM:  Sliding scale w/ Accu-checks.  Hold Metformin for now


6.  DVT Prophylaxis:  SCD/Teds


7.  Social work for d/c planning as needed.


8.  Case discussed w/ ER physician at length, labs/records/imaging reviewed by 

me.





Problem Qualifiers





(1) Traumatic hematoma of face:  


Qualified Codes:  S00.83XA - Contusion of other part of head, initial encounter








Patricia Strong MD 2018 22:59

## 2018-04-29 VITALS
HEART RATE: 63 BPM | DIASTOLIC BLOOD PRESSURE: 59 MMHG | OXYGEN SATURATION: 94 % | TEMPERATURE: 98.4 F | SYSTOLIC BLOOD PRESSURE: 111 MMHG | RESPIRATION RATE: 18 BRPM

## 2018-04-29 VITALS
OXYGEN SATURATION: 99 % | DIASTOLIC BLOOD PRESSURE: 50 MMHG | TEMPERATURE: 98.2 F | RESPIRATION RATE: 16 BRPM | SYSTOLIC BLOOD PRESSURE: 100 MMHG | HEART RATE: 54 BPM

## 2018-04-29 VITALS
TEMPERATURE: 98.4 F | OXYGEN SATURATION: 99 % | HEART RATE: 57 BPM | SYSTOLIC BLOOD PRESSURE: 123 MMHG | RESPIRATION RATE: 18 BRPM | DIASTOLIC BLOOD PRESSURE: 60 MMHG

## 2018-04-29 VITALS
HEART RATE: 56 BPM | SYSTOLIC BLOOD PRESSURE: 107 MMHG | RESPIRATION RATE: 17 BRPM | DIASTOLIC BLOOD PRESSURE: 53 MMHG | OXYGEN SATURATION: 93 %

## 2018-04-29 VITALS
SYSTOLIC BLOOD PRESSURE: 101 MMHG | TEMPERATURE: 98.1 F | DIASTOLIC BLOOD PRESSURE: 51 MMHG | HEART RATE: 63 BPM | OXYGEN SATURATION: 95 % | RESPIRATION RATE: 17 BRPM

## 2018-04-29 VITALS
DIASTOLIC BLOOD PRESSURE: 53 MMHG | RESPIRATION RATE: 18 BRPM | TEMPERATURE: 99 F | SYSTOLIC BLOOD PRESSURE: 115 MMHG | OXYGEN SATURATION: 96 % | HEART RATE: 61 BPM

## 2018-04-29 VITALS
HEART RATE: 62 BPM | SYSTOLIC BLOOD PRESSURE: 127 MMHG | RESPIRATION RATE: 18 BRPM | DIASTOLIC BLOOD PRESSURE: 58 MMHG | TEMPERATURE: 97.7 F | OXYGEN SATURATION: 97 %

## 2018-04-29 LAB
ALBUMIN SERPL-MCNC: 2.8 GM/DL (ref 3.4–5)
ALP SERPL-CCNC: 64 U/L (ref 45–117)
ALT SERPL-CCNC: 20 U/L (ref 10–53)
AST SERPL-CCNC: 22 U/L (ref 15–37)
BASOPHILS # BLD AUTO: 0 TH/MM3 (ref 0–0.2)
BASOPHILS NFR BLD: 0.2 % (ref 0–2)
BILIRUB SERPL-MCNC: 0.2 MG/DL (ref 0.2–1)
BUN SERPL-MCNC: 20 MG/DL (ref 7–18)
CALCIUM SERPL-MCNC: 8.8 MG/DL (ref 8.5–10.1)
CHLORIDE SERPL-SCNC: 105 MEQ/L (ref 98–107)
CREAT SERPL-MCNC: 1.01 MG/DL (ref 0.5–1)
EOSINOPHIL # BLD: 0 TH/MM3 (ref 0–0.4)
EOSINOPHIL NFR BLD: 0 % (ref 0–4)
ERYTHROCYTE [DISTWIDTH] IN BLOOD BY AUTOMATED COUNT: 13.5 % (ref 11.6–17.2)
GFR SERPLBLD BASED ON 1.73 SQ M-ARVRAT: 52 ML/MIN (ref 89–?)
GLUCOSE SERPL-MCNC: 168 MG/DL (ref 74–106)
HCO3 BLD-SCNC: 28.4 MEQ/L (ref 21–32)
HCT VFR BLD CALC: 32.5 % (ref 35–46)
HGB BLD-MCNC: 10.9 GM/DL (ref 11.6–15.3)
LYMPHOCYTES # BLD AUTO: 1.3 TH/MM3 (ref 1–4.8)
LYMPHOCYTES NFR BLD AUTO: 18.5 % (ref 9–44)
MCH RBC QN AUTO: 31.9 PG (ref 27–34)
MCHC RBC AUTO-ENTMCNC: 33.4 % (ref 32–36)
MCV RBC AUTO: 95.5 FL (ref 80–100)
MONOCYTE #: 0.5 TH/MM3 (ref 0–0.9)
MONOCYTES NFR BLD: 7.6 % (ref 0–8)
NEUTROPHILS # BLD AUTO: 5.3 TH/MM3 (ref 1.8–7.7)
NEUTROPHILS NFR BLD AUTO: 73.7 % (ref 16–70)
PLATELET # BLD: 133 TH/MM3 (ref 150–450)
PMV BLD AUTO: 11.3 FL (ref 7–11)
PROT SERPL-MCNC: 5 GM/DL (ref 6.4–8.2)
RBC # BLD AUTO: 3.4 MIL/MM3 (ref 4–5.3)
SODIUM SERPL-SCNC: 139 MEQ/L (ref 136–145)
WBC # BLD AUTO: 7.2 TH/MM3 (ref 4–11)

## 2018-04-29 RX ADMIN — PHENYTOIN SODIUM SCH MLS/HR: 50 INJECTION INTRAMUSCULAR; INTRAVENOUS at 12:27

## 2018-04-29 RX ADMIN — INSULIN ASPART SCH: 100 INJECTION, SOLUTION INTRAVENOUS; SUBCUTANEOUS at 12:00

## 2018-04-29 RX ADMIN — Medication SCH ML: at 20:57

## 2018-04-29 RX ADMIN — Medication SCH ML: at 09:00

## 2018-04-29 RX ADMIN — STANDARDIZED SENNA CONCENTRATE AND DOCUSATE SODIUM SCH TAB: 8.6; 5 TABLET, FILM COATED ORAL at 20:57

## 2018-04-29 RX ADMIN — INSULIN ASPART SCH: 100 INJECTION, SOLUTION INTRAVENOUS; SUBCUTANEOUS at 08:00

## 2018-04-29 RX ADMIN — AMOXICILLIN AND CLAVULANATE POTASSIUM SCH MG: 875; 125 TABLET, FILM COATED ORAL at 21:00

## 2018-04-29 RX ADMIN — INSULIN ASPART SCH: 100 INJECTION, SOLUTION INTRAVENOUS; SUBCUTANEOUS at 16:01

## 2018-04-29 RX ADMIN — ACETAMINOPHEN PRN MG: 325 TABLET ORAL at 17:28

## 2018-04-29 RX ADMIN — AMOXICILLIN AND CLAVULANATE POTASSIUM SCH MG: 875; 125 TABLET, FILM COATED ORAL at 09:56

## 2018-04-29 RX ADMIN — PHENYTOIN SODIUM SCH MLS/HR: 50 INJECTION INTRAMUSCULAR; INTRAVENOUS at 00:15

## 2018-04-29 RX ADMIN — STANDARDIZED SENNA CONCENTRATE AND DOCUSATE SODIUM SCH TAB: 8.6; 5 TABLET, FILM COATED ORAL at 09:56

## 2018-04-29 RX ADMIN — INSULIN ASPART SCH: 100 INJECTION, SOLUTION INTRAVENOUS; SUBCUTANEOUS at 20:57

## 2018-04-29 RX ADMIN — PHENYTOIN SODIUM SCH MLS/HR: 50 INJECTION INTRAMUSCULAR; INTRAVENOUS at 16:08

## 2018-04-29 NOTE — PD.CONS
History of Present Illness


Service


Plastic surgery


Consult Requested By


Primary team


Reason for Consult


Facial fracture


Primary Care Physician


Hellen Rai MD


Diagnoses:  


(1) Orbital floor fracture


(2) Nasal fracture


History of Present Illness


History obtained from chart/daughter as pt poor historian.





87F with a PMH of HTN, Hyperlipidemia, DM and Dementia who is brought to the ER 

by EMS after a fall with significant left-sided facial swelling and hematoma.  





Patient seen with her daughter in the room.  Patient denies pain.  Upon 

prompting she endorses mild facial pain.  She denies vision changes except for 

double vision with upward gaze.  She is edentulous.  





Except as stated in HPI:  all other systems reviewed are Neg





Past Medical History


HTN, Hyperlipidemia, DM and Dementia


Past Surgical History


None


Allergies:  


Coded Allergies:  


     Milk Containing Products (Verified  Allergy, Unknown, 4/28/18)


     codeine (Verified  Allergy, Unknown, 4/28/18)


     erythromycin base (Verified  Allergy, Unknown, 4/28/18)


     pollen extracts (Verified  Allergy, Unknown, 4/28/18)


Family History


Noncontributory to presenting complaint


Social History


Negative for alcohol, tobacco or drugs.





Past Family Social History


Allergies:  


Coded Allergies:  


     Milk Containing Products (Verified  Allergy, Unknown, 4/28/18)


     codeine (Verified  Allergy, Unknown, 4/28/18)


     erythromycin base (Verified  Allergy, Unknown, 4/28/18)


     pollen extracts (Verified  Allergy, Unknown, 4/28/18)





Physical Exam


Vital Signs





Vital Signs








  Date Time  Temp Pulse Resp B/P (MAP) Pulse Ox O2 Delivery O2 Flow Rate FiO2


 


4/29/18 15:57 99.0 61 18 115/53 (73) 96   


 


4/29/18 14:20 98.4 57 18 123/60 (81) 99   


 


4/29/18 11:58 98.2 54 16 100/50 (67) 99   


 


4/29/18 08:10 97.7 62 18 127/58 (81) 97   


 


4/29/18 04:07  56 17 107/53 (71) 93   


 


4/29/18 00:29 98.1 63 17 101/51 (68) 95   


 


4/28/18 21:16  49 15  96 Room Air  


 


4/28/18 21:07 98.1 50 18 158/76 (103) 95   








Physical Exam


No acute distress judgment impaired patient appears confused though pleasant 

moist mucous membranes skin without rash respirations nonlabored moves all 4 

extremities to command digits warm and well-perfused


Patient with bilateral facial ecchymoses left much worse than right


Extraocular muscles intact and without restrictions in upward gaze


Vision appears intact grossly


V1 to 3 intact and symmetric bilaterally


Cranial nerves intact by exam


No nasal septal hematoma


Edentulous except for to left lower mandibular teeth with caries


No intraoral lacerations


No enopthalmos/vertical orbital dystopia appreciated


Laboratory





Laboratory Tests








Test


  4/28/18


21:15 4/29/18


06:15


 


White Blood Count 6.7  7.2 


 


Red Blood Count 4.10  3.40 


 


Hemoglobin 13.1  10.9 


 


Hematocrit 39.0  32.5 


 


Mean Corpuscular Volume 95.0  95.5 


 


Mean Corpuscular Hemoglobin 31.9  31.9 


 


Mean Corpuscular Hemoglobin


Concent 33.6 


  33.4 


 


 


Red Cell Distribution Width 13.3  13.5 


 


Platelet Count 163  133 


 


Mean Platelet Volume 10.3  11.3 


 


Neutrophils (%) (Auto) 52.2  73.7 


 


Lymphocytes (%) (Auto) 36.1  18.5 


 


Monocytes (%) (Auto) 7.9  7.6 


 


Eosinophils (%) (Auto) 3.1  0.0 


 


Basophils (%) (Auto) 0.7  0.2 


 


Neutrophils # (Auto) 3.5  5.3 


 


Lymphocytes # (Auto) 2.4  1.3 


 


Monocytes # (Auto) 0.5  0.5 


 


Eosinophils # (Auto) 0.2  0.0 


 


Basophils # (Auto) 0.0  0.0 


 


CBC Comment DIFF FINAL  DIFF FINAL 


 


Differential Comment    


 


Blood Urea Nitrogen 20  20 


 


Creatinine 1.06  1.01 


 


Random Glucose 146  168 


 


Calcium Level 9.3  8.8 


 


Sodium Level 140  139 


 


Potassium Level 3.9  3.8 


 


Chloride Level 102  105 


 


Carbon Dioxide Level 30.2  28.4 


 


Anion Gap 8  6 


 


Estimat Glomerular Filtration


Rate 49 


  52 


 


 


Total Creatine Kinase 76  


 


Total Protein  5.0 


 


Albumin  2.8 


 


Alkaline Phosphatase  64 


 


Aspartate Amino Transf


(AST/SGOT) 


  22 


 


 


Alanine Aminotransferase


(ALT/SGPT) 


  20 


 


 


Total Bilirubin  0.2 








Result Diagram:  


4/29/18 0615 4/29/18 0615





Imaging


Maxillofacial CT images personally reviewed by me showing left orbital floor 

fracture, less than 2 cm, as well as minimally displaced nasal fracture





Assessment and Plan


Problem List:  


(1) Orbital floor fracture


ICD Codes:  S02.30XA - Fracture of orbital floor, unspecified side, initial 

encounter for closed fracture


(2) Nasal fracture


ICD Codes:  S02.2XXA - Fracture of nasal bones, initial encounter for closed 

fracture


Status:  Acute


Assessment and Plan


87-year-old female with left orbital floor fracture and nasal fracture


Lengthy discussion had with the patient's daughter and patient regarding the 

risks benefits alternative treatments


Discussed that in the absence of entrapment, the indication for surgery would 

be to prevent enopthalmos


However given the size of her fracture and her current exam, this is less likely


Given her age, dementia, and comorbidities, the patient and patient's daughter 

elected to assume the risks of nonoperative treatment of the above fractures





Please consult ophthalmology to rule out occult injury to the left globe 


Patient may follow-up in my clinic as an outpatient in 1-2 weeks


Please call with questions/concerns





Problem Qualifiers





(1) Nasal fracture:  


Qualified Codes:  S02.2XXA - Fracture of nasal bones, initial encounter for 

closed fracture








Chintan Moreno MD Apr 29, 2018 18:30

## 2018-04-29 NOTE — HHI.PR
Subjective


Remarks


The patient denied any pain.  She was not sure what happened to her.  She says 

she does not have much of an appetite right now. Discussed with nursing.





Objective


Vitals





Vital Signs








  Date Time  Temp Pulse Resp B/P (MAP) Pulse Ox O2 Delivery O2 Flow Rate FiO2


 


4/29/18 11:58 98.2 54 16 100/50 (67) 99   


 


4/29/18 08:10 97.7 62 18 127/58 (81) 97   


 


4/29/18 04:07  56 17 107/53 (71) 93   


 


4/29/18 00:29 98.1 63 17 101/51 (68) 95   


 


4/28/18 21:16  49 15  96 Room Air  


 


4/28/18 21:07 98.1 50 18 158/76 (103) 95   














I/O      


 


 4/28/18 4/28/18 4/28/18 4/29/18 4/29/18 4/29/18





 07:00 15:00 23:00 07:00 15:00 23:00


 


Intake Total     1500 ml 


 


Balance     1500 ml 


 


      


 


Intake IV Total     1500 ml 








Result Diagram:  


4/29/18 0615                                                                   

             4/29/18 0615





Imaging





Last Impressions








Maxillofacial CT 4/28/18 0000 Signed





Impressions: 





 Service Date/Time:  Saturday, April 28, 2018 21:25 - CONCLUSION:  1. Displaced 





 fracture through the floor the left orbit with slight herniation of the 

inferior 





 oblique musculature. 2. Left nasal fracture.     Roel Fortune MD 


 


Humerus X-Ray 4/28/18 0000 Signed





Impressions: 





 Service Date/Time:  Saturday, April 28, 2018 21:16 - CONCLUSION: No acute 





 fracture.       Roel Fortune MD 


 


Head CT 4/28/18 0000 Signed





Impressions: 





 Service Date/Time:  Saturday, April 28, 2018 21:25 - CONCLUSION:  1. No acute 





 intracranial abnormality. 2. Left nasal fracture.     Roel Fortune MD 


 


Cervical Spine CT 4/28/18 0000 Signed





Impressions: 





 Service Date/Time:  Saturday, April 28, 2018 21:25 - CONCLUSION:  1. No 

fracture 





 or subluxation.  2. Multilevel degenerative changes. 3. Degenerative 





 retrolisthesis C3 on 4     Roel Fortune MD 








Objective Remarks


GENERAL: Elderly female in no acute distress.  


HEENT: Left eye is swollen closed. Significant facial swelling/hematoma due to 

injury. Nontender.


CARDIOVASCULAR: Regular rate and rhythm. Grade 2 systolic murmur appreciated. 

No chest tenderness to palpation. 


RESPIRATORY: No obvious rhonchi or wheezing. Clear to auscultation. Breath 

sounds equal bilaterally. 


GASTROINTESTINAL: Abdomen soft, non-tender, nondistended. BS normal. 


MUSCULOSKELETAL: Extremities without clubbing, cyanosis, or edema. No obvious 

deformities. 


NEUROLOGICAL: Awake, alert and oriented x4. No focal neurologic deficits. 

Moving both upper and lower extremities spontaneously.


Medications and IVs





Current Medications








 Medications


  (Trade)  Dose


 Ordered  Sig/Dillan


 Route  Start Time


 Stop Time Status Last Admin


 


  (Augmentin)  875 mg  Q12HR


 PO  4/29/18 09:00


    4/29/18 09:56


 


 


  (D50w (Vial) Inj)  50 ml  UNSCH  PRN


 IV PUSH  4/28/18 23:00


     


 


 


  (Glucagon Inj)  1 mg  UNSCH  PRN


 OTHER  4/28/18 23:00


     


 


 


  (NovoLOG


 SUPPLEMENTAL


 SCALE)  1  ACHS SLIDING  SCALE


 SQ  4/29/18 08:00


     


 


 


 Sodium Chloride  1,000 ml @ 


 100 mls/hr  Q10H


 IV  4/28/18 22:56


    4/29/18 12:27


 


 


  (NS Flush)  2 ml  UNSCH  PRN


 IV FLUSH  4/28/18 23:00


     


 


 


  (NS Flush)  2 ml  BID


 IV FLUSH  4/29/18 09:00


     


 


 


  (Zofran Inj)  4 mg  Q6H  PRN


 IVP  4/28/18 23:00


     


 


 


  (Tylenol)  650 mg  Q6H  PRN


 PO  4/28/18 23:00


     


 


 


  (Morphine Inj)  2 mg  Q3H  PRN


 IV PUSH  4/28/18 23:00


     


 


 


  (Kerri-Colace)  1 tab  BID


 PO  4/29/18 09:00


    4/29/18 09:56


 


 


  (Milk Of


 Magnesia Liq)  30 ml  Q12H  PRN


 PO  4/28/18 23:00


     


 


 


  (Senokot)  17.2 mg  Q12H  PRN


 PO  4/28/18 23:00


     


 


 


  (Dulcolax Supp)  10 mg  DAILY  PRN


 RECTAL  4/28/18 23:00


     


 


 


  (Lactulose Liq)  30 ml  DAILY  PRN


 PO  4/28/18 23:00


     


 


 


  (Buspar)  20 mg  BID


 PO  4/29/18 09:00


    4/29/18 09:56


 


 


 Patient Own


 Medication  PT OWN MED:


 Dextromethorphan


 HBr-Quinid...  BID


 PO  4/29/18 09:00


   Future Hold  


 











A/P


Problem List:  


(1) Fall


ICD Code:  W19.XXXA - Unspecified fall, initial encounter


(2) Orbital floor fracture


ICD Code:  S02.30XA - Fracture of orbital floor, unspecified side, initial 

encounter for closed fracture


(3) Traumatic hematoma of face


ICD Code:  S00.83XA - Contusion of other part of head, initial encounter


Status:  Acute


(4) Epistaxis


ICD Code:  R04.0 - Epistaxis


(5) DM (diabetes mellitus)


ICD Code:  E11.9 - Type 2 diabetes mellitus without complications


Assessment and Plan


Fall/ Left orbital floor Fx/ Nasal fracture


S/p mechanical fall at [facility], unwitnessed, pt does not believe she lost 

consciousness.  CT Head/C-Spine w/ no acute findings. CT Maxillofacial w/ 

displaced fracture through floor of left orbit with slight herniation of 

inferior oblique musculature, no impingement. Evaluated by trauma surgery.


- oral surgery consult requested. 


- Analgesics/antiemetics. Ice packs as needed. 


- PT/ OT. 





Epistaxis/ Anemia


Secondary to above, w/ associated nausea/vomiting, bleeding now controlled.  

Hemoglobin has decreased overnight.  


- Will monitor, repeat labs in am. 


- follow CBC, transfuse as needed.





DM


Glucose well controlled.


- Sliding scale w/ Accu-checks.  


- Hold Metformin for now.





DVT Prophylaxis:  SCD/Teds


Discharge Planning


Await oral surgery eval, CBC stability, may need SNF





Problem Qualifiers





(1) Traumatic hematoma of face:  


Qualified Codes:  S00.83XA - Contusion of other part of head, initial encounter








Kendell Aquino DO Apr 29, 2018 13:06

## 2018-04-30 VITALS
HEART RATE: 66 BPM | SYSTOLIC BLOOD PRESSURE: 99 MMHG | OXYGEN SATURATION: 94 % | DIASTOLIC BLOOD PRESSURE: 55 MMHG | RESPIRATION RATE: 18 BRPM | TEMPERATURE: 98.3 F

## 2018-04-30 VITALS
OXYGEN SATURATION: 96 % | SYSTOLIC BLOOD PRESSURE: 159 MMHG | DIASTOLIC BLOOD PRESSURE: 70 MMHG | TEMPERATURE: 97.4 F | HEART RATE: 59 BPM | RESPIRATION RATE: 18 BRPM

## 2018-04-30 VITALS
DIASTOLIC BLOOD PRESSURE: 62 MMHG | OXYGEN SATURATION: 96 % | RESPIRATION RATE: 20 BRPM | TEMPERATURE: 98.8 F | HEART RATE: 65 BPM | SYSTOLIC BLOOD PRESSURE: 135 MMHG

## 2018-04-30 VITALS
HEART RATE: 60 BPM | TEMPERATURE: 98.1 F | DIASTOLIC BLOOD PRESSURE: 66 MMHG | SYSTOLIC BLOOD PRESSURE: 139 MMHG | RESPIRATION RATE: 18 BRPM | OXYGEN SATURATION: 95 %

## 2018-04-30 VITALS
DIASTOLIC BLOOD PRESSURE: 62 MMHG | SYSTOLIC BLOOD PRESSURE: 125 MMHG | TEMPERATURE: 98.1 F | HEART RATE: 66 BPM | RESPIRATION RATE: 20 BRPM | OXYGEN SATURATION: 95 %

## 2018-04-30 VITALS
RESPIRATION RATE: 16 BRPM | HEART RATE: 55 BPM | OXYGEN SATURATION: 96 % | TEMPERATURE: 97.9 F | DIASTOLIC BLOOD PRESSURE: 59 MMHG | SYSTOLIC BLOOD PRESSURE: 130 MMHG

## 2018-04-30 VITALS
TEMPERATURE: 99.6 F | OXYGEN SATURATION: 97 % | SYSTOLIC BLOOD PRESSURE: 131 MMHG | HEART RATE: 62 BPM | DIASTOLIC BLOOD PRESSURE: 60 MMHG | RESPIRATION RATE: 16 BRPM

## 2018-04-30 VITALS — OXYGEN SATURATION: 97 %

## 2018-04-30 LAB
BUN SERPL-MCNC: 15 MG/DL (ref 7–18)
CALCIUM SERPL-MCNC: 9.3 MG/DL (ref 8.5–10.1)
CHLORIDE SERPL-SCNC: 114 MEQ/L (ref 98–107)
CREAT SERPL-MCNC: 0.9 MG/DL (ref 0.5–1)
ERYTHROCYTE [DISTWIDTH] IN BLOOD BY AUTOMATED COUNT: 13.7 % (ref 11.6–17.2)
GFR SERPLBLD BASED ON 1.73 SQ M-ARVRAT: 59 ML/MIN (ref 89–?)
GLUCOSE SERPL-MCNC: 112 MG/DL (ref 74–106)
HCO3 BLD-SCNC: 27.6 MEQ/L (ref 21–32)
HCT VFR BLD CALC: 32.2 % (ref 35–46)
HGB BLD-MCNC: 11 GM/DL (ref 11.6–15.3)
MAGNESIUM SERPL-MCNC: 1.8 MG/DL (ref 1.5–2.5)
MCH RBC QN AUTO: 32.5 PG (ref 27–34)
MCHC RBC AUTO-ENTMCNC: 34.1 % (ref 32–36)
MCV RBC AUTO: 95.5 FL (ref 80–100)
PLATELET # BLD: 124 TH/MM3 (ref 150–450)
PMV BLD AUTO: 11.2 FL (ref 7–11)
RBC # BLD AUTO: 3.38 MIL/MM3 (ref 4–5.3)
SODIUM SERPL-SCNC: 146 MEQ/L (ref 136–145)
WBC # BLD AUTO: 7.1 TH/MM3 (ref 4–11)

## 2018-04-30 RX ADMIN — ACETAMINOPHEN PRN MG: 325 TABLET ORAL at 18:21

## 2018-04-30 RX ADMIN — ACETAMINOPHEN PRN MG: 325 TABLET ORAL at 12:01

## 2018-04-30 RX ADMIN — INSULIN ASPART SCH: 100 INJECTION, SOLUTION INTRAVENOUS; SUBCUTANEOUS at 12:00

## 2018-04-30 RX ADMIN — AMOXICILLIN AND CLAVULANATE POTASSIUM SCH MG: 875; 125 TABLET, FILM COATED ORAL at 08:18

## 2018-04-30 RX ADMIN — STANDARDIZED SENNA CONCENTRATE AND DOCUSATE SODIUM SCH TAB: 8.6; 5 TABLET, FILM COATED ORAL at 08:18

## 2018-04-30 RX ADMIN — Medication SCH ML: at 20:38

## 2018-04-30 RX ADMIN — Medication SCH ML: at 08:15

## 2018-04-30 RX ADMIN — ACETAMINOPHEN PRN MG: 325 TABLET ORAL at 20:42

## 2018-04-30 RX ADMIN — PRAVASTATIN SODIUM SCH MG: 40 TABLET ORAL at 08:17

## 2018-04-30 RX ADMIN — INSULIN ASPART SCH: 100 INJECTION, SOLUTION INTRAVENOUS; SUBCUTANEOUS at 21:00

## 2018-04-30 RX ADMIN — INSULIN ASPART SCH: 100 INJECTION, SOLUTION INTRAVENOUS; SUBCUTANEOUS at 08:15

## 2018-04-30 RX ADMIN — AMOXICILLIN AND CLAVULANATE POTASSIUM SCH MG: 875; 125 TABLET, FILM COATED ORAL at 20:38

## 2018-04-30 RX ADMIN — PHENYTOIN SODIUM SCH MLS/HR: 50 INJECTION INTRAMUSCULAR; INTRAVENOUS at 12:02

## 2018-04-30 RX ADMIN — PHENYTOIN SODIUM SCH MLS/HR: 50 INJECTION INTRAMUSCULAR; INTRAVENOUS at 04:56

## 2018-04-30 RX ADMIN — INSULIN ASPART SCH: 100 INJECTION, SOLUTION INTRAVENOUS; SUBCUTANEOUS at 17:00

## 2018-04-30 RX ADMIN — STANDARDIZED SENNA CONCENTRATE AND DOCUSATE SODIUM SCH TAB: 8.6; 5 TABLET, FILM COATED ORAL at 20:38

## 2018-04-30 NOTE — HHI.PR
Subjective


Remarks


Patient is confused but denies any pain. She denies eye pain or blurry vision.





Objective


Vitals





Vital Signs








  Date Time  Temp Pulse Resp B/P (MAP) Pulse Ox O2 Delivery O2 Flow Rate FiO2


 


4/30/18 12:41 99.6 62 16 131/60 (83) 97   


 


4/30/18 08:00 98.1 66 20 125/62 (83) 95   


 


4/30/18 05:09 98.8 65 20 135/62 (86) 96   


 


4/30/18 00:29 98.3 66 18 99/55 (70) 94   


 


4/29/18 20:27 98.4 63 18 111/59 (76) 94   


 


4/29/18 15:57 99.0 61 18 115/53 (73) 96   














I/O      


 


 4/29/18 4/29/18 4/29/18 4/30/18 4/30/18 4/30/18





 07:00 15:00 23:00 07:00 15:00 23:00


 


Intake Total  1500 ml    


 


Balance  1500 ml    


 


      


 


Intake IV Total  1500 ml    


 


# Voids   1   








Result Diagram:  


4/30/18 1119                                                                   

             4/30/18 1119





Objective Remarks


GENERAL: Elderly female in no acute distress.  


HEENT: Can open both eyes but there is some ecchymoses. No pain with eye 

movement. Ecchymoses involving the whole face and neck region. Non tender.


CARDIOVASCULAR: Regular rate and rhythm. Grade 2 systolic murmur appreciated. 

No chest tenderness to palpation. 


RESPIRATORY: No obvious rhonchi or wheezing. Clear to auscultation. Breath 

sounds equal bilaterally. 


GASTROINTESTINAL: Abdomen soft, non-tender, nondistended. BS normal. 


MUSCULOSKELETAL: Extremities without clubbing, cyanosis, or edema. No obvious 

deformities. 


NEUROLOGICAL: Awake and alert. Oriented to self and place only. Moves all 

extremities.





A/P


Problem List:  


(1) Fall


ICD Code:  W19.XXXA - Unspecified fall, initial encounter


(2) Orbital floor fracture


ICD Code:  S02.30XA - Fracture of orbital floor, unspecified side, initial 

encounter for closed fracture


(3) Traumatic hematoma of face


ICD Code:  S00.83XA - Contusion of other part of head, initial encounter


Status:  Acute


(4) Epistaxis


ICD Code:  R04.0 - Epistaxis


(5) DM (diabetes mellitus)


ICD Code:  E11.9 - Type 2 diabetes mellitus without complications


Assessment and Plan


Fall/ Left orbital floor Fx/ Nasal fracture


S/p mechanical fall at [facility], unwitnessed, pt does not believe she lost 

consciousness.  CT Head/C-Spine w/ no acute findings. CT Maxillofacial w/ 

displaced fracture through floor of left orbit with slight herniation of 

inferior oblique musculature, no impingement. Evaluated by trauma surgery.


- Plastic surgery evaluated the patient and per discussion with family, elected 

to treat conservatively. 


- No eye symptoms or change in vision. Swelling improved. Defer Ophthalmology 

consult for now. 


- Analgesics/antiemetics. Ice packs as needed. 


- PT/ OT. 





Epistaxis/ Anemia


Secondary to above, w/ associated nausea/vomiting, bleeding now controlled.  


- H&H stable and improved today. 





DM


Glucose well controlled.


- Sliding scale w/ Accu-checks.  


- Hold Metformin for now.





DVT Prophylaxis:  SCD/Teds


Discharge Planning


Will need SNF placement once midnight stay rules fulfilled. [facility].





Problem Qualifiers





(1) Traumatic hematoma of face:  


Qualified Codes:  S00.83XA - Contusion of other part of head, initial encounter








Cortez Croft MD Apr 30, 2018 15:24

## 2018-05-01 VITALS
HEART RATE: 57 BPM | RESPIRATION RATE: 18 BRPM | DIASTOLIC BLOOD PRESSURE: 62 MMHG | TEMPERATURE: 97.7 F | SYSTOLIC BLOOD PRESSURE: 137 MMHG | OXYGEN SATURATION: 98 %

## 2018-05-01 VITALS
HEART RATE: 80 BPM | DIASTOLIC BLOOD PRESSURE: 70 MMHG | OXYGEN SATURATION: 100 % | TEMPERATURE: 98.4 F | SYSTOLIC BLOOD PRESSURE: 124 MMHG | RESPIRATION RATE: 19 BRPM

## 2018-05-01 VITALS
SYSTOLIC BLOOD PRESSURE: 137 MMHG | TEMPERATURE: 98.4 F | HEART RATE: 70 BPM | OXYGEN SATURATION: 96 % | DIASTOLIC BLOOD PRESSURE: 63 MMHG | RESPIRATION RATE: 18 BRPM

## 2018-05-01 VITALS
RESPIRATION RATE: 18 BRPM | TEMPERATURE: 98.1 F | SYSTOLIC BLOOD PRESSURE: 130 MMHG | DIASTOLIC BLOOD PRESSURE: 62 MMHG | OXYGEN SATURATION: 95 % | HEART RATE: 65 BPM

## 2018-05-01 VITALS
SYSTOLIC BLOOD PRESSURE: 137 MMHG | DIASTOLIC BLOOD PRESSURE: 64 MMHG | HEART RATE: 71 BPM | TEMPERATURE: 98.4 F | RESPIRATION RATE: 19 BRPM | OXYGEN SATURATION: 99 %

## 2018-05-01 VITALS
DIASTOLIC BLOOD PRESSURE: 60 MMHG | SYSTOLIC BLOOD PRESSURE: 146 MMHG | OXYGEN SATURATION: 95 % | RESPIRATION RATE: 18 BRPM | HEART RATE: 63 BPM | TEMPERATURE: 97.9 F

## 2018-05-01 VITALS
SYSTOLIC BLOOD PRESSURE: 154 MMHG | DIASTOLIC BLOOD PRESSURE: 67 MMHG | OXYGEN SATURATION: 97 % | HEART RATE: 69 BPM | RESPIRATION RATE: 18 BRPM | TEMPERATURE: 98.6 F

## 2018-05-01 VITALS
OXYGEN SATURATION: 99 % | RESPIRATION RATE: 17 BRPM | SYSTOLIC BLOOD PRESSURE: 142 MMHG | HEART RATE: 66 BPM | DIASTOLIC BLOOD PRESSURE: 67 MMHG | TEMPERATURE: 98.1 F

## 2018-05-01 VITALS
DIASTOLIC BLOOD PRESSURE: 70 MMHG | OXYGEN SATURATION: 96 % | SYSTOLIC BLOOD PRESSURE: 142 MMHG | TEMPERATURE: 98.4 F | HEART RATE: 62 BPM | RESPIRATION RATE: 16 BRPM

## 2018-05-01 VITALS
SYSTOLIC BLOOD PRESSURE: 157 MMHG | OXYGEN SATURATION: 93 % | DIASTOLIC BLOOD PRESSURE: 72 MMHG | TEMPERATURE: 97.8 F | HEART RATE: 71 BPM | RESPIRATION RATE: 18 BRPM

## 2018-05-01 RX ADMIN — PHENYTOIN SODIUM SCH MLS/HR: 50 INJECTION INTRAMUSCULAR; INTRAVENOUS at 10:51

## 2018-05-01 RX ADMIN — PHENYTOIN SODIUM SCH MLS/HR: 50 INJECTION INTRAMUSCULAR; INTRAVENOUS at 20:56

## 2018-05-01 RX ADMIN — PRAVASTATIN SODIUM SCH MG: 40 TABLET ORAL at 10:51

## 2018-05-01 RX ADMIN — PHENYTOIN SODIUM SCH MLS/HR: 50 INJECTION INTRAMUSCULAR; INTRAVENOUS at 00:32

## 2018-05-01 RX ADMIN — INSULIN ASPART SCH: 100 INJECTION, SOLUTION INTRAVENOUS; SUBCUTANEOUS at 08:00

## 2018-05-01 RX ADMIN — STANDARDIZED SENNA CONCENTRATE AND DOCUSATE SODIUM SCH TAB: 8.6; 5 TABLET, FILM COATED ORAL at 10:50

## 2018-05-01 RX ADMIN — AMOXICILLIN AND CLAVULANATE POTASSIUM SCH MG: 875; 125 TABLET, FILM COATED ORAL at 10:51

## 2018-05-01 RX ADMIN — INSULIN ASPART SCH: 100 INJECTION, SOLUTION INTRAVENOUS; SUBCUTANEOUS at 17:00

## 2018-05-01 RX ADMIN — STANDARDIZED SENNA CONCENTRATE AND DOCUSATE SODIUM SCH TAB: 8.6; 5 TABLET, FILM COATED ORAL at 20:51

## 2018-05-01 RX ADMIN — Medication SCH ML: at 20:50

## 2018-05-01 RX ADMIN — ACETAMINOPHEN PRN MG: 325 TABLET ORAL at 13:26

## 2018-05-01 RX ADMIN — Medication SCH ML: at 10:50

## 2018-05-01 RX ADMIN — INSULIN ASPART SCH: 100 INJECTION, SOLUTION INTRAVENOUS; SUBCUTANEOUS at 12:00

## 2018-05-01 RX ADMIN — INSULIN ASPART SCH: 100 INJECTION, SOLUTION INTRAVENOUS; SUBCUTANEOUS at 20:56

## 2018-05-01 RX ADMIN — AMOXICILLIN AND CLAVULANATE POTASSIUM SCH MG: 875; 125 TABLET, FILM COATED ORAL at 20:51
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
## 2018-05-01 NOTE — HHI.PR
Subjective


Remarks


Continued fall risk.  Working with PT.  Pain controlled.  Will need SNF at 

discharge.





Objective





Vital Signs








  Date Time  Temp Pulse Resp B/P (MAP) Pulse Ox O2 Delivery O2 Flow Rate FiO2


 


5/1/18 08:00 98.1 66 17 142/67 (92) 99   


 


5/1/18 04:00 97.8 71 18 157/72 (100) 93   


 


5/1/18 03:31 98.4 70 18 137/63 (87) 96   


 


5/1/18 02:30 97.9 63 18 146/60 (88) 95   


 


5/1/18 01:30 98.1 65 18 130/62 (84) 95   


 


5/1/18 00:30 98.4 62 16 142/70 (94) 96   


 


5/1/18 00:00 97.7 57 18 137/62 (87) 98   


 


4/30/18 23:30 97.4 59 18 159/70 (99) 96   


 


4/30/18 22:39     97   


 


4/30/18 20:00 98.1 60 18 139/66 (90) 95   


 


4/30/18 16:00 97.9 55 16 130/59 (82) 96   


 


4/30/18 12:41 99.6 62 16 131/60 (83) 97   














I/O      


 


 4/30/18 4/30/18 4/30/18 5/1/18 5/1/18 5/1/18





 07:00 15:00 23:00 07:00 15:00 23:00


 


      


 


# Voids     1 


 


# Bowel Movements    1  








Result Diagram:  


4/30/18 1119                                                                   

             4/30/18 1119





Objective Remarks


87 year old female status post fall, admitted with orbital floor fracture and 

nasal fracture, weakness persisting.





Fall/ Left orbital floor Fx/ Nasal fracture


S/p mechanical fall at [facility], unwitnessed


Will need SNF at discharge


Conservative treatments for now


Plastic Surgeon following


Ophthalmology following


Continue PT and OT





Epistaxis/ Anemia


Epistaxis resolved


H/H stabalized


Follow H/H





Diabetes mellitus type 2


Follow blood sugars


Insulin sliding scale


Diabetic diet


Metformin on hold





DVT Prophylaxis


SCDs





Discharge Planning


Transition back to an inpatient rehab setting tomorrow, if stable











Ari Griffin MD May 1, 2018 11:32
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
## 2018-05-02 VITALS
RESPIRATION RATE: 18 BRPM | TEMPERATURE: 98.1 F | HEART RATE: 78 BPM | OXYGEN SATURATION: 95 % | DIASTOLIC BLOOD PRESSURE: 68 MMHG | SYSTOLIC BLOOD PRESSURE: 146 MMHG

## 2018-05-02 VITALS
SYSTOLIC BLOOD PRESSURE: 150 MMHG | HEART RATE: 78 BPM | DIASTOLIC BLOOD PRESSURE: 86 MMHG | RESPIRATION RATE: 18 BRPM | TEMPERATURE: 97.9 F | OXYGEN SATURATION: 97 %

## 2018-05-02 VITALS
RESPIRATION RATE: 20 BRPM | OXYGEN SATURATION: 97 % | SYSTOLIC BLOOD PRESSURE: 119 MMHG | HEART RATE: 85 BPM | DIASTOLIC BLOOD PRESSURE: 63 MMHG | TEMPERATURE: 99.4 F

## 2018-05-02 VITALS
DIASTOLIC BLOOD PRESSURE: 73 MMHG | SYSTOLIC BLOOD PRESSURE: 121 MMHG | TEMPERATURE: 98.3 F | OXYGEN SATURATION: 99 % | HEART RATE: 73 BPM | RESPIRATION RATE: 20 BRPM

## 2018-05-02 VITALS — SYSTOLIC BLOOD PRESSURE: 136 MMHG | RESPIRATION RATE: 20 BRPM | DIASTOLIC BLOOD PRESSURE: 74 MMHG

## 2018-05-02 RX ADMIN — AMOXICILLIN AND CLAVULANATE POTASSIUM SCH MG: 875; 125 TABLET, FILM COATED ORAL at 10:11

## 2018-05-02 RX ADMIN — ACETAMINOPHEN PRN MG: 325 TABLET ORAL at 11:47

## 2018-05-02 RX ADMIN — INSULIN ASPART SCH: 100 INJECTION, SOLUTION INTRAVENOUS; SUBCUTANEOUS at 07:58

## 2018-05-02 RX ADMIN — Medication SCH ML: at 09:00

## 2018-05-02 RX ADMIN — PHENYTOIN SODIUM SCH MLS/HR: 50 INJECTION INTRAMUSCULAR; INTRAVENOUS at 04:24

## 2018-05-02 RX ADMIN — PRAVASTATIN SODIUM SCH MG: 40 TABLET ORAL at 10:11

## 2018-05-02 RX ADMIN — STANDARDIZED SENNA CONCENTRATE AND DOCUSATE SODIUM SCH TAB: 8.6; 5 TABLET, FILM COATED ORAL at 10:11

## 2018-05-02 RX ADMIN — INSULIN ASPART SCH: 100 INJECTION, SOLUTION INTRAVENOUS; SUBCUTANEOUS at 13:30

## 2018-05-02 NOTE — HHI.DS
__________________________________________________





Discharge Summary


Admission Date


Apr 29, 2018 at 09:17


Discharge Date:  May 2, 2018


Admitting Diagnosis





Inferior orbital wall fracture, hematemesis, epistaxis, CHI





(1) Fall


ICD Code:  W19.XXXA - Unspecified fall, initial encounter


Diagnosis:  Principal





(2) Orbital floor fracture


ICD Code:  S02.30XA - Fracture of orbital floor, unspecified side, initial 

encounter for closed fracture


Diagnosis:  Principal





(3) Traumatic hematoma of face


ICD Code:  S00.83XA - Contusion of other part of head, initial encounter


Diagnosis:  Principal


Status:  Acute


(4) Epistaxis


ICD Code:  R04.0 - Epistaxis


Diagnosis:  Principal





(5) DM (diabetes mellitus)


ICD Code:  E11.9 - Type 2 diabetes mellitus without complications


Diagnosis:  Principal





Procedures


None


Brief History - From Admission


This is an 87-year-old female with a PMH of HTN, Hyperlipidemia, DM and 

Dementia who is brought to the ER by EMS after a fall with significant left-

sided facial swelling and hematoma.  Patient currently retirement resident, fall was 

unwitnessed however patient does not believe she lost consciousness.  Not on 

anticoagulation.  On arrival, /76, HR 50, O2 sat 95% on RA, Afebrile.  

CBC unremarkable.  Chemistry at baseline.  CT Head with no acute findings, left 

nasal fracture.  CT C-spine with no acute fracture.  CT Maxillofacial with 

displaced fracture through floor of left orbit with slight herniation of 

inferior oblique musculature, left nasal fracture.  Humerus X-ray no acute 

fracture.  Pt to be d/c'd from ER w/ outpatient ENT follow up as no emergent 

intervention needed, however had episode of epistaxis w/ subsequent nausea/

vomiting.  Pt [facility] resident, unsafe d/c home at this time.


CBC/BMP:  


4/30/18 1119                                                                   

             4/30/18 1119





Significant Findings





Laboratory Tests








Test


  4/30/18


11:19


 


Red Blood Count


  3.38 MIL/MM3


(4.00-5.30)


 


Hemoglobin


  11.0 GM/DL


(11.6-15.3)


 


Hematocrit


  32.2 %


(35.0-46.0)


 


Platelet Count


  124 TH/MM3


(150-450)


 


Mean Platelet Volume


  11.2 FL


(7.0-11.0)


 


Random Glucose


  112 MG/DL


()


 


Sodium Level


  146 MEQ/L


(136-145)


 


Chloride Level


  114 MEQ/L


()


 


Anion Gap 4 MEQ/L (5-15) 


 


Estimat Glomerular Filtration


Rate 59 ML/MIN


(>89)








PE at Discharge


GENERAL: Elderly female in no acute distress.  


HEENT: Can open both eyes but there is some ecchymoses. No pain with eye 

movement. Ecchymoses involving the whole face and neck region. Non tender.


CARDIOVASCULAR: Regular rate and rhythm. Grade 2 systolic murmur appreciated. 

No chest tenderness to palpation. 


RESPIRATORY: No obvious rhonchi or wheezing. Clear to auscultation. Breath 

sounds equal bilaterally. 


GASTROINTESTINAL: Abdomen soft, non-tender, nondistended. BS normal. 


MUSCULOSKELETAL: Extremities without clubbing, cyanosis, or edema. No obvious 

deformities. 


NEUROLOGICAL: Awake and alert. Oriented to self and place only. Moves all 

extremities.


Hospital Course


Mrs. Pena is an 87-year-old female.  She came in secondary to fall.  At 

baseline she has Alzheimer's disease.  This was an unwitnessed fall.  She is 

found to have a fracture of her nose and left inferior portion of her orbital 

fossa.  She was evaluated by maxillofacial surgeon who does not recommend 

surgery.  She is evaluated by the ophthalmologist who does not recommend 

surgery.  Alzheimer's disease is present at baseline and contributory.  Her 

daughter would like her mother transition to hospice.  Hospice has reviewed 

this case and will continue to follow this patient as an outpatient.  At this 

point patient is medically clear and stable for discharge.  Skilled nursing 

facility is offered as an option to the patient and her daughter and the 

daughter prefers that she be transitioned back to her previous retirement with a 

better monitor status.  She is medically stable and cleared for discharge when 

bed is available.


Pt Condition on Discharge:  Deteriorating


Discharge Disposition:  [facility]/retirement


Discharge Time:  > 30 minutes


Discharge Instructions


DIET: Follow Instructions for:  As Tolerated, No Restrictions


Activities you can perform:  Regular-No Restrictions


Follow up Referrals:  


PCP Follow-up - 2 Weeks





Continued Medications:  


Amlodipine (Amlodipine) 2.5 Mg Tab


2.5 MG PO DAILY for Blood Pressure Management, #30 TAB 0 Refills





Amoxicillin-Clavulanate Liq (Augmentin Es-600 Liq) 600-42.9 Mg/5 Ml Susp


600 MG PO BID for Infection for 10 Days, ML 0 Refills


Not for adults, adolescents, or children >/= 40kg. Not interchangeable


 with 200 mg/5 mL or 400 mg/5 mL due to clavulanic acid.


Buspirone (Buspirone) 10 Mg Tab


20 MG PO BID for Anxiety, TAB 0 Refills





Dextromethorphan HBr-Quinidine (Nuedexta 20-10 mg) 20 Mg-10 Mg Cap


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





Lisinopril-Hctz (Lisinopril-Hctz) 20-12.5 Mg Tab


1 TAB PO DAILY for Blood Pressure Management, #30 TAB 0 Refills





Loratadine (Claritin) 10 Mg Cap


10 MG PO DAILY for Allergy Management for 10 Days, #10 CAP 0 Refills





Melatonin (Melatonin) 5 Mg Tab


3 MG PO HS for Provide Good Sleep, TAB 0 Refills





Metformin (Metformin) 500 Mg Tab


500 MG PO DAILY for Blood Sugar Management, #30 TAB 0 Refills


With a meal


Pravastatin (Pravastatin) 40 Mg Tab


40 MG PO DAILY for Cholesterol Management, #30 TAB 0 Refills





 


Discontinued Medications:  


Aspirin (Aspirin) 81 Mg Chew


81 MG CHEW DAILY, TAB 0 Refills





Hydrocodone-Acetaminophen (Norco) 5 Mg-325 Mg Tab


1 TAB PO Q6H PRN for PAIN, #15 TAB 0 Refills

















Ari Griffin MD May 2, 2018 14:28